# Patient Record
Sex: MALE
[De-identification: names, ages, dates, MRNs, and addresses within clinical notes are randomized per-mention and may not be internally consistent; named-entity substitution may affect disease eponyms.]

---

## 2019-07-17 ENCOUNTER — HOSPITAL ENCOUNTER (OUTPATIENT)
Dept: HOSPITAL 90 - LAB | Age: 69
Discharge: HOME | End: 2019-07-17
Attending: SURGERY
Payer: MEDICARE

## 2019-07-17 DIAGNOSIS — K42.9: Primary | ICD-10-CM

## 2019-07-17 LAB
BUN SERPL-MCNC: 19 MG/DL (ref 7–18)
CHLORIDE SERPL-SCNC: 106 MMOL/L (ref 101–111)
CO2 SERPL-SCNC: 31 MMOL/L (ref 21–32)
CREAT SERPL-MCNC: 1 MG/DL (ref 0.5–1.5)
GFR SERPL CREATININE-BSD FRML MDRD: 79 ML/MIN (ref 60–?)
GLUCOSE SERPL-MCNC: 128 MG/DL (ref 70–105)
POTASSIUM SERPL-SCNC: 4.5 MMOL/L (ref 3.5–5.1)
SODIUM SERPL-SCNC: 141 MMOL/L (ref 136–145)

## 2019-07-17 PROCEDURE — 36415 COLL VENOUS BLD VENIPUNCTURE: CPT

## 2019-07-17 PROCEDURE — 80048 BASIC METABOLIC PNL TOTAL CA: CPT

## 2019-07-23 ENCOUNTER — HOSPITAL ENCOUNTER (OUTPATIENT)
Dept: HOSPITAL 90 - RAH | Age: 69
Discharge: HOME | End: 2019-07-23
Attending: SURGERY
Payer: MEDICARE

## 2019-07-23 DIAGNOSIS — K76.0: ICD-10-CM

## 2019-07-23 DIAGNOSIS — N28.1: ICD-10-CM

## 2019-07-23 DIAGNOSIS — D73.89: ICD-10-CM

## 2019-07-23 DIAGNOSIS — K42.9: Primary | ICD-10-CM

## 2019-07-23 DIAGNOSIS — K57.30: ICD-10-CM

## 2019-07-23 DIAGNOSIS — N32.89: ICD-10-CM

## 2019-07-23 DIAGNOSIS — N20.0: ICD-10-CM

## 2019-07-23 PROCEDURE — 74177 CT ABD & PELVIS W/CONTRAST: CPT

## 2019-09-17 ENCOUNTER — HOSPITAL ENCOUNTER (EMERGENCY)
Dept: HOSPITAL 90 - EDH | Age: 69
Discharge: HOME | End: 2019-09-17
Payer: MEDICARE

## 2019-09-17 DIAGNOSIS — Z72.0: ICD-10-CM

## 2019-09-17 DIAGNOSIS — K92.2: Primary | ICD-10-CM

## 2019-09-17 DIAGNOSIS — E78.5: ICD-10-CM

## 2019-09-17 LAB
ALBUMIN SERPL-MCNC: 3.8 G/DL (ref 3.5–5)
ALT SERPL-CCNC: 35 U/L (ref 12–78)
APTT PPP: 27.9 SEC (ref 26.3–35.5)
AST SERPL-CCNC: 29 U/L (ref 10–37)
BASOPHILS NFR BLD AUTO: 0.4 % (ref 0–5)
BILIRUB DIRECT SERPL-MCNC: 0.2 MG/DL (ref 0–0.3)
BILIRUB SERPL-MCNC: 0.7 MG/DL (ref 0.2–1)
BUN SERPL-MCNC: 19 MG/DL (ref 7–18)
CHLORIDE SERPL-SCNC: 108 MMOL/L (ref 101–111)
CK SERPL-CCNC: 200 U/L (ref 21–232)
CO2 SERPL-SCNC: 30 MMOL/L (ref 21–32)
CREAT SERPL-MCNC: 0.8 MG/DL (ref 0.5–1.5)
EOSINOPHIL NFR BLD AUTO: 3.4 % (ref 0–8)
ERYTHROCYTE [DISTWIDTH] IN BLOOD BY AUTOMATED COUNT: 13 % (ref 11–15.5)
GFR SERPL CREATININE-BSD FRML MDRD: 102 ML/MIN (ref 60–?)
GLUCOSE SERPL-MCNC: 145 MG/DL (ref 70–105)
HCT VFR BLD AUTO: 40.7 % (ref 42–54)
INR PPP: 0.95 (ref 0.85–1.15)
LIPASE SERPL-CCNC: 195 U/L (ref 114–286)
LYMPHOCYTES NFR SPEC AUTO: 29.6 % (ref 21–51)
MCH RBC QN AUTO: 32.6 PG (ref 27–33)
MCHC RBC AUTO-ENTMCNC: 34.5 G/DL (ref 32–36)
MCV RBC AUTO: 94.6 FL (ref 79–99)
MONOCYTES NFR BLD AUTO: 10.5 % (ref 3–13)
NEUTROPHILS NFR BLD AUTO: 56.1 % (ref 40–77)
NRBC BLD MANUAL-RTO: 0.1 % (ref 0–0.19)
PLATELET # BLD AUTO: 192 K/UL (ref 130–400)
POTASSIUM SERPL-SCNC: 3.9 MMOL/L (ref 3.5–5.1)
PROT SERPL-MCNC: 7.2 G/DL (ref 6–8.3)
PROTHROMBIN TIME: 10 SEC (ref 9.6–11.6)
RBC # BLD AUTO: 4.31 MIL/UL (ref 4.5–6.2)
SODIUM SERPL-SCNC: 144 MMOL/L (ref 136–145)
WBC # BLD AUTO: 6.4 K/UL (ref 4.8–10.8)

## 2019-09-17 PROCEDURE — 93005 ELECTROCARDIOGRAM TRACING: CPT

## 2019-09-17 PROCEDURE — 85730 THROMBOPLASTIN TIME PARTIAL: CPT

## 2019-09-17 PROCEDURE — 99285 EMERGENCY DEPT VISIT HI MDM: CPT

## 2019-09-17 PROCEDURE — 83690 ASSAY OF LIPASE: CPT

## 2019-09-17 PROCEDURE — 80076 HEPATIC FUNCTION PANEL: CPT

## 2019-09-17 PROCEDURE — 82550 ASSAY OF CK (CPK): CPT

## 2019-09-17 PROCEDURE — 85025 COMPLETE CBC W/AUTO DIFF WBC: CPT

## 2019-09-17 PROCEDURE — 80048 BASIC METABOLIC PNL TOTAL CA: CPT

## 2019-09-17 PROCEDURE — 85610 PROTHROMBIN TIME: CPT

## 2019-09-17 PROCEDURE — 96374 THER/PROPH/DIAG INJ IV PUSH: CPT

## 2019-09-17 PROCEDURE — 36415 COLL VENOUS BLD VENIPUNCTURE: CPT

## 2019-09-17 PROCEDURE — 74176 CT ABD & PELVIS W/O CONTRAST: CPT

## 2019-10-03 ENCOUNTER — HOSPITAL ENCOUNTER (EMERGENCY)
Dept: HOSPITAL 90 - EDH | Age: 69
Discharge: HOME | End: 2019-10-03
Payer: MEDICARE

## 2019-10-03 DIAGNOSIS — I10: ICD-10-CM

## 2019-10-03 DIAGNOSIS — M62.838: Primary | ICD-10-CM

## 2019-10-03 DIAGNOSIS — E78.5: ICD-10-CM

## 2019-10-03 LAB
ALBUMIN SERPL-MCNC: 3.7 G/DL (ref 3.5–5)
ALT SERPL-CCNC: 30 U/L (ref 12–78)
AST SERPL-CCNC: 28 U/L (ref 10–37)
BASOPHILS NFR BLD AUTO: 0.6 % (ref 0–5)
BILIRUB DIRECT SERPL-MCNC: 0.2 MG/DL (ref 0–0.3)
BILIRUB SERPL-MCNC: 0.7 MG/DL (ref 0.2–1)
BUN SERPL-MCNC: 12 MG/DL (ref 7–18)
CHLORIDE SERPL-SCNC: 105 MMOL/L (ref 101–111)
CO2 SERPL-SCNC: 29 MMOL/L (ref 21–32)
CREAT SERPL-MCNC: 0.8 MG/DL (ref 0.5–1.5)
DEPRECATED SQUAMOUS URNS QL MICRO: (no result) /HPF (ref 0–2)
EOSINOPHIL NFR BLD AUTO: 3.3 % (ref 0–8)
ERYTHROCYTE [DISTWIDTH] IN BLOOD BY AUTOMATED COUNT: 13.2 % (ref 11–15.5)
FINE GRAN CASTS URNS QL MICRO: (no result) /LPF
GFR SERPL CREATININE-BSD FRML MDRD: 102 ML/MIN (ref 60–?)
GLUCOSE SERPL-MCNC: 123 MG/DL (ref 70–105)
HCT VFR BLD AUTO: 41.5 % (ref 42–54)
LYMPHOCYTES NFR SPEC AUTO: 24.4 % (ref 21–51)
MCH RBC QN AUTO: 33.3 PG (ref 27–33)
MCHC RBC AUTO-ENTMCNC: 34.8 G/DL (ref 32–36)
MCV RBC AUTO: 95.7 FL (ref 79–99)
MONOCYTES NFR BLD AUTO: 11.1 % (ref 3–13)
NEUTROPHILS NFR BLD AUTO: 60.6 % (ref 40–77)
NRBC BLD MANUAL-RTO: 0 % (ref 0–0.19)
PH UR STRIP: 6.5 [PH] (ref 5–8)
PLATELET # BLD AUTO: 200 K/UL (ref 130–400)
POTASSIUM SERPL-SCNC: 4.2 MMOL/L (ref 3.5–5.1)
PROT SERPL-MCNC: 7.5 G/DL (ref 6–8.3)
RBC # BLD AUTO: 4.34 MIL/UL (ref 4.5–6.2)
RBC #/AREA URNS HPF: (no result) /HPF (ref 0–1)
SODIUM SERPL-SCNC: 139 MMOL/L (ref 136–145)
SP GR UR STRIP: 1.02 (ref 1–1.03)
UROBILINOGEN UR STRIP-MCNC: 1 MG/DL (ref 0.2–1)
WBC # BLD AUTO: 7.5 K/UL (ref 4.8–10.8)
WBC #/AREA URNS HPF: (no result) /HPF (ref 0–1)

## 2019-10-03 PROCEDURE — 99285 EMERGENCY DEPT VISIT HI MDM: CPT

## 2019-10-03 PROCEDURE — 96375 TX/PRO/DX INJ NEW DRUG ADDON: CPT

## 2019-10-03 PROCEDURE — 93005 ELECTROCARDIOGRAM TRACING: CPT

## 2019-10-03 PROCEDURE — 85025 COMPLETE CBC W/AUTO DIFF WBC: CPT

## 2019-10-03 PROCEDURE — 36415 COLL VENOUS BLD VENIPUNCTURE: CPT

## 2019-10-03 PROCEDURE — 74176 CT ABD & PELVIS W/O CONTRAST: CPT

## 2019-10-03 PROCEDURE — 80076 HEPATIC FUNCTION PANEL: CPT

## 2019-10-03 PROCEDURE — 80053 COMPREHEN METABOLIC PANEL: CPT

## 2019-10-03 PROCEDURE — 81001 URINALYSIS AUTO W/SCOPE: CPT

## 2019-10-03 PROCEDURE — 96374 THER/PROPH/DIAG INJ IV PUSH: CPT

## 2019-10-24 ENCOUNTER — HOSPITAL ENCOUNTER (OUTPATIENT)
Dept: HOSPITAL 90 - RAH | Age: 69
Discharge: HOME | End: 2019-10-24
Attending: UROLOGY
Payer: MEDICARE

## 2019-10-24 DIAGNOSIS — N20.0: ICD-10-CM

## 2019-10-24 DIAGNOSIS — K59.00: Primary | ICD-10-CM

## 2019-10-24 PROCEDURE — 74018 RADEX ABDOMEN 1 VIEW: CPT

## 2019-11-04 VITALS — DIASTOLIC BLOOD PRESSURE: 66 MMHG | SYSTOLIC BLOOD PRESSURE: 114 MMHG

## 2019-11-04 LAB
APTT PPP: 28.9 SEC (ref 26.3–35.5)
BASOPHILS NFR BLD AUTO: 0.9 % (ref 0–5)
BUN SERPL-MCNC: 15 MG/DL (ref 7–18)
CHLORIDE SERPL-SCNC: 104 MMOL/L (ref 101–111)
CO2 SERPL-SCNC: 31 MMOL/L (ref 21–32)
CREAT SERPL-MCNC: 1 MG/DL (ref 0.5–1.5)
EOSINOPHIL NFR BLD AUTO: 3 % (ref 0–8)
ERYTHROCYTE [DISTWIDTH] IN BLOOD BY AUTOMATED COUNT: 13.1 % (ref 11–15.5)
GFR SERPL CREATININE-BSD FRML MDRD: 79 ML/MIN (ref 60–?)
GLUCOSE SERPL-MCNC: 125 MG/DL (ref 70–105)
HCT VFR BLD AUTO: 42.3 % (ref 42–54)
INR PPP: 0.96 (ref 0.85–1.15)
LYMPHOCYTES NFR SPEC AUTO: 30.3 % (ref 21–51)
MCH RBC QN AUTO: 33.2 PG (ref 27–33)
MCHC RBC AUTO-ENTMCNC: 34.4 G/DL (ref 32–36)
MCV RBC AUTO: 96.4 FL (ref 79–99)
MONOCYTES NFR BLD AUTO: 9.7 % (ref 3–13)
NEUTROPHILS NFR BLD AUTO: 56.1 % (ref 40–77)
NRBC BLD MANUAL-RTO: 0 % (ref 0–0.19)
PH UR STRIP: 5 [PH] (ref 5–8)
PLATELET # BLD AUTO: 186 K/UL (ref 130–400)
POTASSIUM SERPL-SCNC: 4.8 MMOL/L (ref 3.5–5.1)
PROTHROMBIN TIME: 10.1 SEC (ref 9.6–11.6)
RBC # BLD AUTO: 4.39 MIL/UL (ref 4.5–6.2)
RBC #/AREA URNS HPF: (no result) /HPF (ref 0–1)
SODIUM SERPL-SCNC: 143 MMOL/L (ref 136–145)
SP GR UR STRIP: 1.03 (ref 1–1.03)
UROBILINOGEN UR STRIP-MCNC: 1 MG/DL (ref 0.2–1)
WBC # BLD AUTO: 5.9 K/UL (ref 4.8–10.8)
WBC #/AREA URNS HPF: (no result) /HPF (ref 0–1)

## 2019-11-07 ENCOUNTER — HOSPITAL ENCOUNTER (OUTPATIENT)
Dept: HOSPITAL 90 - DAH | Age: 69
Discharge: HOME | End: 2019-11-07
Attending: UROLOGY
Payer: MEDICARE

## 2019-11-07 VITALS — BODY MASS INDEX: 28.84 KG/M2 | HEIGHT: 63 IN | WEIGHT: 162.8 LBS

## 2019-11-07 VITALS — SYSTOLIC BLOOD PRESSURE: 129 MMHG | DIASTOLIC BLOOD PRESSURE: 79 MMHG

## 2019-11-07 VITALS — SYSTOLIC BLOOD PRESSURE: 150 MMHG | DIASTOLIC BLOOD PRESSURE: 82 MMHG

## 2019-11-07 VITALS — DIASTOLIC BLOOD PRESSURE: 84 MMHG | SYSTOLIC BLOOD PRESSURE: 143 MMHG

## 2019-11-07 VITALS — SYSTOLIC BLOOD PRESSURE: 144 MMHG | DIASTOLIC BLOOD PRESSURE: 80 MMHG

## 2019-11-07 VITALS — DIASTOLIC BLOOD PRESSURE: 79 MMHG | SYSTOLIC BLOOD PRESSURE: 133 MMHG

## 2019-11-07 VITALS — SYSTOLIC BLOOD PRESSURE: 120 MMHG | DIASTOLIC BLOOD PRESSURE: 46 MMHG

## 2019-11-07 VITALS — SYSTOLIC BLOOD PRESSURE: 157 MMHG | DIASTOLIC BLOOD PRESSURE: 88 MMHG

## 2019-11-07 VITALS — SYSTOLIC BLOOD PRESSURE: 144 MMHG | DIASTOLIC BLOOD PRESSURE: 85 MMHG

## 2019-11-07 VITALS — DIASTOLIC BLOOD PRESSURE: 78 MMHG | SYSTOLIC BLOOD PRESSURE: 136 MMHG

## 2019-11-07 VITALS — DIASTOLIC BLOOD PRESSURE: 80 MMHG | SYSTOLIC BLOOD PRESSURE: 134 MMHG

## 2019-11-07 VITALS — DIASTOLIC BLOOD PRESSURE: 76 MMHG | SYSTOLIC BLOOD PRESSURE: 139 MMHG

## 2019-11-07 VITALS — SYSTOLIC BLOOD PRESSURE: 125 MMHG | DIASTOLIC BLOOD PRESSURE: 76 MMHG

## 2019-11-07 VITALS — DIASTOLIC BLOOD PRESSURE: 81 MMHG | SYSTOLIC BLOOD PRESSURE: 140 MMHG

## 2019-11-07 VITALS — SYSTOLIC BLOOD PRESSURE: 146 MMHG | DIASTOLIC BLOOD PRESSURE: 80 MMHG

## 2019-11-07 VITALS — DIASTOLIC BLOOD PRESSURE: 71 MMHG | SYSTOLIC BLOOD PRESSURE: 130 MMHG

## 2019-11-07 VITALS — SYSTOLIC BLOOD PRESSURE: 123 MMHG | DIASTOLIC BLOOD PRESSURE: 72 MMHG

## 2019-11-07 VITALS — DIASTOLIC BLOOD PRESSURE: 81 MMHG | SYSTOLIC BLOOD PRESSURE: 142 MMHG

## 2019-11-07 VITALS — DIASTOLIC BLOOD PRESSURE: 84 MMHG | SYSTOLIC BLOOD PRESSURE: 145 MMHG

## 2019-11-07 DIAGNOSIS — M19.90: ICD-10-CM

## 2019-11-07 DIAGNOSIS — N40.0: ICD-10-CM

## 2019-11-07 DIAGNOSIS — N20.0: Primary | ICD-10-CM

## 2019-11-07 DIAGNOSIS — Z86.73: ICD-10-CM

## 2019-11-07 DIAGNOSIS — E78.5: ICD-10-CM

## 2019-11-07 DIAGNOSIS — J44.9: ICD-10-CM

## 2019-11-07 DIAGNOSIS — Z79.01: ICD-10-CM

## 2019-11-07 PROCEDURE — 81001 URINALYSIS AUTO W/SCOPE: CPT

## 2019-11-07 PROCEDURE — 85730 THROMBOPLASTIN TIME PARTIAL: CPT

## 2019-11-07 PROCEDURE — 93005 ELECTROCARDIOGRAM TRACING: CPT

## 2019-11-07 PROCEDURE — 85025 COMPLETE CBC W/AUTO DIFF WBC: CPT

## 2019-11-07 PROCEDURE — 74018 RADEX ABDOMEN 1 VIEW: CPT

## 2019-11-07 PROCEDURE — 36415 COLL VENOUS BLD VENIPUNCTURE: CPT

## 2019-11-07 PROCEDURE — 85610 PROTHROMBIN TIME: CPT

## 2019-11-07 PROCEDURE — 80048 BASIC METABOLIC PNL TOTAL CA: CPT

## 2019-11-07 PROCEDURE — 87088 URINE BACTERIA CULTURE: CPT

## 2019-11-07 PROCEDURE — 50590 FRAGMENTING OF KIDNEY STONE: CPT

## 2019-11-07 RX ADMIN — SODIUM CHLORIDE SCH GM: 9 INJECTION, SOLUTION INTRAVENOUS at 09:00

## 2019-11-07 RX ADMIN — SODIUM CHLORIDE SCH GM: 9 INJECTION, SOLUTION INTRAVENOUS at 09:40

## 2024-12-30 ENCOUNTER — HOSPITAL ENCOUNTER (OUTPATIENT)
Dept: HOSPITAL 90 - RAH | Age: 74
Discharge: HOME | End: 2024-12-30
Payer: MEDICARE

## 2024-12-30 DIAGNOSIS — M50.10: ICD-10-CM

## 2024-12-30 DIAGNOSIS — M47.22: Primary | ICD-10-CM

## 2024-12-30 DIAGNOSIS — M48.02: ICD-10-CM

## 2024-12-30 PROCEDURE — 72141 MRI NECK SPINE W/O DYE: CPT

## 2024-12-30 NOTE — [facility]
MR SPINAL CANAL, CERV WO CON



REASON: M48.02 Spinal stenosis, cervical region



COMPARISON: None 



TECHNIQUE: Routine cervical imaging protocol was performed. Exam was

performed without IV contrast. 



FINDINGS: 



There is moderate interspace narrowing C4, C5 and C6. There are

posterior ridges at multiple vertebral body levels. There are

degenerative changes in the posterior elements as well. There is normal

vertebral body alignment.



Axial images show patent C2-3 interspace. There is a large central disc

protrusion at the C3-4, AP diameter in the midline is decreased to

between 5 and 6 mm. There is focal concavity of the anterior cord with

effacement of surrounding CSF spaces. Neural foramina appear preserved.



C4-5 shows broad-based annular bulge along with posterior degenerative

changes. There is severe spinal stenosis, AP diameter of the canal is

approximately 4 mm. There is focal increased signal in the cord at this

level consistent with edema or myelomalacia.



C5-6 shows only mild canal stenosis, AP diameter between 7 and 8 mm.



C6-7 interspaces well preserved, AP diameter is between 8 and 9 mm.



There is severe bilateral neural foraminal narrowing at C4-5, and C5-6

and at C6-7.



IMPRESSION: 

  

1. Severe cervical spondylosis, there is severe spinal stenosis at C4-5

with a BB diameter 4 mm, there is abnormal signal in the cord at this

level.

2. There are multiple additional areas of canal narrowing and foraminal

narrowing as described above.